# Patient Record
Sex: MALE | Race: WHITE
[De-identification: names, ages, dates, MRNs, and addresses within clinical notes are randomized per-mention and may not be internally consistent; named-entity substitution may affect disease eponyms.]

---

## 2019-10-04 ENCOUNTER — HOSPITAL ENCOUNTER (EMERGENCY)
Dept: HOSPITAL 62 - ER | Age: 64
Discharge: HOME | End: 2019-10-04
Payer: MEDICARE

## 2019-10-04 VITALS — DIASTOLIC BLOOD PRESSURE: 55 MMHG | SYSTOLIC BLOOD PRESSURE: 165 MMHG

## 2019-10-04 DIAGNOSIS — I25.10: ICD-10-CM

## 2019-10-04 DIAGNOSIS — R07.81: Primary | ICD-10-CM
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
## 2019-10-04 DIAGNOSIS — I10: ICD-10-CM

## 2019-10-04 PROCEDURE — 99283 EMERGENCY DEPT VISIT LOW MDM: CPT

## 2019-10-04 NOTE — ER DOCUMENT REPORT
HPI





- HPI


Time Seen by Provider: 10/04/19 09:25


Notes: 





Patient is a 64-year-old male with history of hypertension and coronary artery 

disease who presents complaining of right lower lateral and somewhat anterior 

rib pain status post injury 2 weeks ago.  Patient states he fell off his porch 

and injured his ribs.  Patient states that the pain subsided a week later, but 

has been feeling like a bone is moving in that area primarily when he is 

twisting or pushing on that spot.  He has not noticed any bruising.  He is able 

to eat and drink without difficulty.  He is urinating normally and having normal

bowel movements without any blood noted.  No other concerns or complaints.  

Patient states that he is here for an x-ray as he wants to see if there is a 

fractured rib.  Denies any headache, fever, head injury, neck pain, URI, sore 

throat, chest pain, palpitations, syncope, cough, shortness of breath, wheeze, 

dyspnea, abdominal pain, nausea/vomiting/diarrhea, urinary retention, dysuria, 

hematuria, or rash.





- ROS


Systems Reviewed and Negative: Yes All other systems reviewed and negative





Past Medical History





- Social History


Smoking Status: Unknown if Ever Smoked


Family History: Reviewed & Not Pertinent





- Past Medical History


Cardiac Medical History: Reports: Hx Hypertension





- Immunizations


Hx Diphtheria, Pertussis, Tetanus Vaccination: No





Vertical Provider Document





- CONSTITUTIONAL


Agree With Documented VS: Yes


Notes: 





PHYSICAL EXAMINATION:





GENERAL: Well-appearing, well-nourished and in no acute distress.





HEAD: Atraumatic, normocephalic.





EYES: Pupils equal round and reactive to light, extraocular movements intact, 

sclera anicteric, conjunctiva are normal.





ENT: Nares patent and without discharge.  oropharynx clear without exudates.  No

tonsilar hypertrophy or erythema.  Moist mucous membranes.  





NECK: Normal range of motion, supple without lymphadenopathy





Chest/ribs:  + reproducible tenderness to the lower lateral ribs to palp.  





LUNGS: Breath sounds clear to auscultation bilaterally and equal.  No wheezes 

rales or rhonchi.





HEART: Regular rate and rhythm without murmurs, rubs, gallops.





ABDOMEN: Soft, nontender, nondistended abdomen.  No guarding, no rebound.  

Normal bowel sounds present.  No CVA tenderness bilaterally.  No ecchymosis.  

Ford neg.





Musculoskeletal: FROM to passive/active. Strength 5+/5. 





Extremities:  No cyanosis, clubbing, or edema b/l.  Peripheral pulses 2+.  

Capillary refill less than 3 seconds.





NEUROLOGICAL: Cranial nerves grossly intact.  Normal speech, normal gait.  

Normal sensory, motor exams 





PSYCH: Normal mood, normal affect.





SKIN: Warm, Dry, normal turgor, no rashes or lesions noted.





- INFECTION CONTROL


TRAVEL OUTSIDE OF THE [place] IN LAST 30 DAYS: No





Course





- Re-evaluation


Re-evalutation: 





10/04/19 


Patient is an afebrile, well-hydrated, 64-year-old male who presents with Rt rib

pain, suspect contusion vs intercostal etiology.  Vitals are acceptable without 

significant tachycardia, tachypnea, or hypoxia.  PE is otherwise unremarkable.  

Patient is nontoxic-appearing and is tolerating p.o. without difficulty.  XR 

unremarkable.  No further work-up warranted.  Patient does not want anything for

his discomfort and has no pain when he does not twist or press in the area. No 

CP, SOB, LORD.  Low suspicion for any ACS, PE, pneumothorax, pericarditis, 

dissection, respiratory compromise, severe dehydration, sepsis, meningitis, or 

other systemic emergent condition at this time.  Patient is aware that condition

can change from initial presentation and he needs to monitor symptoms closely 

and seek medical attention for any acute changes.  Recommend conservative 

measures for symptoms.  Recheck with your PCM in 3-5 days.  Return to the ED 

with any worsening/concerning symptoms otherwise as reviewed in discharge.  

Patient is in agreement.








- Vital Signs


Vital signs: 


                                        











Temp Pulse Resp BP Pulse Ox


 


 97.9 F   60   18   165/55 H  97 


 


 10/04/19 09:21  10/04/19 09:21  10/04/19 09:21  10/04/19 09:21  10/04/19 09:21














Discharge





- Discharge


Clinical Impression: 


 Rib pain on right side





Condition: Stable


Disposition: HOME, SELF-CARE


Additional Instructions: 


Rest, Ice, Compression, Elevation


Tylenol/ibuprofen as needed


Light stretches daily


Moist heat and massage may help


F/u with your PCP in 3-5 days for a recheck


Consider consult(s) with Orthopedics/physical therapy for ongoing/worsening 

symptoms





Return to the ED with any worsening symptoms and/or development of fever, 

headache, chest pain, palpitations, syncope, shortness of breath, trouble 

breathing, abdominal pain, n/v/d, muscle weakness/paralysis, numbness/tingling, 

swelling, redness, or other worsening symptoms that are concerning to you.


Forms:  Elevated Blood Pressure


Referrals: 


PERLITA TOTH MD [Primary Care Provider] - Follow up as needed


[facility] [Provider Group] - Follow up as needed

## 2019-10-04 NOTE — RADIOLOGY REPORT (SQ)
EXAM DESCRIPTION:  RIBS RIGHT W/PA CHEST



COMPLETED DATE/TIME:  10/4/2019 10:01 am



REASON FOR STUDY:  Rt anterior and lateral lower rib pain s/p injury



COMPARISON:  None.



TECHNIQUE:  Frontal view of the chest and additional views of the right ribs acquired.



NUMBER OF VIEWS:  Five view.



LIMITATIONS:  None.



FINDINGS:  FRONTAL CXR: No pneumothorax.  No pleural effusion.  No atelectasis or infiltrates.

RIBS: No displaced rib fractures.  No lytic or blastic bony lesions.

OTHER: No other significant finding.



IMPRESSION:  NO PNEUMOTHORAX.  NO DISPLACED RIB FRACTURES.



COMMENT:  SITE OF TRAUMA/COMPLAINT MARKED/STAMP COMPLETED: NO.



TECHNICAL DOCUMENTATION:  JOB ID:  [id number]

 2011 [employer]- All Rights Reserved



Reading location - IP/workstation name: [email or web address]